# Patient Record
Sex: MALE | HISPANIC OR LATINO | Employment: FULL TIME | URBAN - METROPOLITAN AREA
[De-identification: names, ages, dates, MRNs, and addresses within clinical notes are randomized per-mention and may not be internally consistent; named-entity substitution may affect disease eponyms.]

---

## 2023-08-06 ENCOUNTER — HOSPITAL ENCOUNTER (EMERGENCY)
Facility: CLINIC | Age: 23
Discharge: HOME | End: 2023-08-06
Attending: EMERGENCY MEDICINE | Admitting: EMERGENCY MEDICINE

## 2023-08-06 ENCOUNTER — APPOINTMENT (OUTPATIENT)
Dept: GENERAL RADIOLOGY | Facility: CLINIC | Age: 23
End: 2023-08-06
Attending: EMERGENCY MEDICINE

## 2023-08-06 VITALS
DIASTOLIC BLOOD PRESSURE: 101 MMHG | RESPIRATION RATE: 16 BRPM | SYSTOLIC BLOOD PRESSURE: 149 MMHG | HEART RATE: 97 BPM | TEMPERATURE: 98 F | OXYGEN SATURATION: 96 %

## 2023-08-06 DIAGNOSIS — S93.401A SPRAIN OF RIGHT ANKLE, UNSPECIFIED LIGAMENT, INITIAL ENCOUNTER: ICD-10-CM

## 2023-08-06 PROCEDURE — 99283 EMERGENCY DEPT VISIT LOW MDM: CPT

## 2023-08-06 PROCEDURE — 73610 X-RAY EXAM OF ANKLE: CPT | Mod: RT

## 2023-08-06 ASSESSMENT — ACTIVITIES OF DAILY LIVING (ADL): ADLS_ACUITY_SCORE: 35

## 2023-08-06 NOTE — ED PROVIDER NOTES
History     Chief Complaint:  Ankle Pain       The history is provided by the patient. A  was used (Sinhala).      Kj Russ is a 23 year old male who presents with pain to the lateral aspect of the right ankle since 5 days ago.  He reports rolling the ankle laterally while walking on the stairs. The pain does not radiate to other areas.  He denies other injuries.        Independent Historian:   None - Patient Only    Review of External Notes:   No prior records available       Medications:    No current outpatient medications on file.    Past Medical History:    No past medical history on file.    Physical Exam   Patient Vitals for the past 24 hrs:   BP Temp Pulse Resp SpO2   08/06/23 1329 (!) 149/101 98  F (36.7  C) 97 16 96 %        Physical Exam    General: Sitting on the ED bed  HEENT: Normocephalic, atraumatic  Cardiac: Right DP 2+, regular rate and rhythm  Pulm: Breathing comfortably, no accessory muscle usage, no conversational dyspnea  MSK: Generalized swelling about the right lateral and medial malleoli without crepitus or bony deformity, tender to palpation over the right medial and lateral malleoli, right proximal fibula nontender, right fifth metatarsal nontender  Skin: Warm and dry  Neuro: Moves all extremities  Psych: Pleasant mood and affect     Emergency Department Course     Imaging:  Ankle XR, G/E 3 views, right   Final Result   IMPRESSION: Soft tissue swelling over the lateral malleolus. No acute fracture is identified. Chronic ossicle at the dorsal talar head, likely related to remote avulsion injury. There is normal joint spacing and alignment. The ankle mortise is congruent.    The talar dome is unremarkable.         Report per radiology    Emergency Department Course & Assessments:    Assessments:  1451 Initial assessment. I gathered history and examined the patient as noted above.     Independent Interpretation (X-rays, CTs, rhythm strip):  I independently  reviewed the ankle X-ray.  No ankle fracture in the plain film.  The mortise is intact.    Social Determinants of Health affecting care:   None    Disposition:  The patient was discharged to home.     Impression & Plan      Medical Decision Makin-year-old male presents with right ankle inversion injury as above.  The right foot is neurovascularly intact.  No clinical evidence of a Solorio or Maisonneuve fracture.  Ankle x-ray shows no bony injury.  Overall presentation is consistent with a right ankle sprain.  Patient given recommendations for rest, ice, elevation.  His ankle was placed in an Ace wrap and he was given a set of crutches for comfort.  Plan is for discharge home with follow-up as needed, providing contact information for orthopedics should the patient have persistent pain after several weeks.  The patient expressed understanding with of and agreement with the plan.      Diagnosis:    ICD-10-CM    1. Sprain of right ankle, unspecified ligament, initial encounter  S93.401A                 Scribe Disclosure:  Micaela SINGER, am serving as a scribe at 2:20 PM on 2023 to document services personally performed by Constantine Palafox MD based on my observations and the provider's statements to me.     2023   Constantine Palafox MD King, Colin, MD  [phone]